# Patient Record
Sex: FEMALE | Race: WHITE | Employment: PART TIME | URBAN - METROPOLITAN AREA
[De-identification: names, ages, dates, MRNs, and addresses within clinical notes are randomized per-mention and may not be internally consistent; named-entity substitution may affect disease eponyms.]

---

## 2017-07-14 ENCOUNTER — HOSPITAL ENCOUNTER (OUTPATIENT)
Dept: NON INVASIVE DIAGNOSTICS | Age: 57
Discharge: HOME | End: 2017-07-14
Attending: INTERNAL MEDICINE | Admitting: INTERNAL MEDICINE

## 2017-07-14 DIAGNOSIS — M89.8X9 EXOSTOSIS OF UNSPECIFIED SITE: ICD-10-CM

## 2017-10-03 ENCOUNTER — HOSPITAL ENCOUNTER (OUTPATIENT)
Dept: MAMMOGRAPHY | Age: 57
Discharge: HOME | End: 2017-10-03
Attending: INTERNAL MEDICINE | Admitting: INTERNAL MEDICINE

## 2017-10-03 DIAGNOSIS — Z12.31 VISIT FOR SCREENING MAMMOGRAM: ICD-10-CM

## 2018-12-27 ENCOUNTER — HOSPITAL ENCOUNTER (OUTPATIENT)
Dept: MAMMOGRAPHY | Age: 58
Discharge: HOME | End: 2019-01-01
Payer: COMMERCIAL

## 2018-12-27 DIAGNOSIS — Z12.31 VISIT FOR SCREENING MAMMOGRAM: ICD-10-CM

## 2018-12-27 PROCEDURE — 77067 SCR MAMMO BI INCL CAD: CPT

## 2020-09-02 ENCOUNTER — APPOINTMENT (OUTPATIENT)
Dept: CT IMAGING | Age: 60
End: 2020-09-02
Payer: MEDICARE

## 2020-09-02 ENCOUNTER — HOSPITAL ENCOUNTER (OUTPATIENT)
Age: 60
Setting detail: OBSERVATION
Discharge: HOME | End: 2020-09-03
Attending: EMERGENCY MEDICINE | Admitting: INTERNAL MEDICINE
Payer: MEDICARE

## 2020-09-02 ENCOUNTER — APPOINTMENT (OUTPATIENT)
Dept: GENERAL RADIOLOGY | Age: 60
End: 2020-09-02
Payer: MEDICARE

## 2020-09-02 PROBLEM — I47.1 SVT (SUPRAVENTRICULAR TACHYCARDIA) (HCC): Status: ACTIVE | Noted: 2020-09-02

## 2020-09-02 LAB
A/G RATIO: 1.4 (ref 1.1–2.2)
ALBUMIN SERPL-MCNC: 4.5 G/DL (ref 3.4–5)
ALP BLD-CCNC: 94 U/L (ref 40–129)
ALT SERPL-CCNC: 56 U/L (ref 10–40)
ANION GAP SERPL CALCULATED.3IONS-SCNC: 17 MMOL/L (ref 3–16)
APTT: 28.4 SEC (ref 24.2–36.2)
AST SERPL-CCNC: 30 U/L (ref 15–37)
BASOPHILS ABSOLUTE: 0.1 K/UL (ref 0–0.2)
BASOPHILS RELATIVE PERCENT: 0.5 %
BILIRUB SERPL-MCNC: 0.3 MG/DL (ref 0–1)
BILIRUBIN URINE: NEGATIVE
BLOOD, URINE: NEGATIVE
BUN BLDV-MCNC: 12 MG/DL (ref 7–20)
CALCIUM SERPL-MCNC: 9.4 MG/DL (ref 8.3–10.6)
CHLORIDE BLD-SCNC: 98 MMOL/L (ref 99–110)
CLARITY: CLEAR
CO2: 22 MMOL/L (ref 21–32)
COLOR: YELLOW
CREAT SERPL-MCNC: 0.8 MG/DL (ref 0.6–1.2)
EOSINOPHILS ABSOLUTE: 0.1 K/UL (ref 0–0.6)
EOSINOPHILS RELATIVE PERCENT: 1.1 %
GFR AFRICAN AMERICAN: >60
GFR NON-AFRICAN AMERICAN: >60
GLOBULIN: 3.2 G/DL
GLUCOSE BLD-MCNC: 280 MG/DL (ref 70–99)
GLUCOSE BLD-MCNC: 282 MG/DL (ref 70–99)
GLUCOSE URINE: 100 MG/DL
HCT VFR BLD CALC: 43.5 % (ref 36–48)
HEMOGLOBIN: 14.5 G/DL (ref 12–16)
INR BLD: 1.02 (ref 0.86–1.14)
KETONES, URINE: NEGATIVE MG/DL
LEUKOCYTE ESTERASE, URINE: NEGATIVE
LYMPHOCYTES ABSOLUTE: 3 K/UL (ref 1–5.1)
LYMPHOCYTES RELATIVE PERCENT: 30.1 %
MAGNESIUM: 1.9 MG/DL (ref 1.8–2.4)
MCH RBC QN AUTO: 30.1 PG (ref 26–34)
MCHC RBC AUTO-ENTMCNC: 33.3 G/DL (ref 31–36)
MCV RBC AUTO: 90.5 FL (ref 80–100)
MICROSCOPIC EXAMINATION: ABNORMAL
MONOCYTES ABSOLUTE: 0.8 K/UL (ref 0–1.3)
MONOCYTES RELATIVE PERCENT: 8.2 %
NEUTROPHILS ABSOLUTE: 5.9 K/UL (ref 1.7–7.7)
NEUTROPHILS RELATIVE PERCENT: 60.1 %
NITRITE, URINE: NEGATIVE
PDW BLD-RTO: 13.6 % (ref 12.4–15.4)
PERFORMED ON: ABNORMAL
PH UA: 6 (ref 5–8)
PLATELET # BLD: 222 K/UL (ref 135–450)
PMV BLD AUTO: 9.6 FL (ref 5–10.5)
POTASSIUM SERPL-SCNC: 4 MMOL/L (ref 3.5–5.1)
PROTEIN UA: NEGATIVE MG/DL
PROTHROMBIN TIME: 11.8 SEC (ref 10–13.2)
RBC # BLD: 4.81 M/UL (ref 4–5.2)
SODIUM BLD-SCNC: 137 MMOL/L (ref 136–145)
SPECIFIC GRAVITY UA: <=1.005 (ref 1–1.03)
TOTAL PROTEIN: 7.7 G/DL (ref 6.4–8.2)
TROPONIN: 0.07 NG/ML
URINE REFLEX TO CULTURE: ABNORMAL
URINE TYPE: ABNORMAL
UROBILINOGEN, URINE: 0.2 E.U./DL
WBC # BLD: 9.9 K/UL (ref 4–11)

## 2020-09-02 PROCEDURE — G0378 HOSPITAL OBSERVATION PER HR: HCPCS

## 2020-09-02 PROCEDURE — 84484 ASSAY OF TROPONIN QUANT: CPT

## 2020-09-02 PROCEDURE — 2580000003 HC RX 258: Performed by: EMERGENCY MEDICINE

## 2020-09-02 PROCEDURE — 96375 TX/PRO/DX INJ NEW DRUG ADDON: CPT

## 2020-09-02 PROCEDURE — 93005 ELECTROCARDIOGRAM TRACING: CPT | Performed by: EMERGENCY MEDICINE

## 2020-09-02 PROCEDURE — 71045 X-RAY EXAM CHEST 1 VIEW: CPT

## 2020-09-02 PROCEDURE — 80053 COMPREHEN METABOLIC PANEL: CPT

## 2020-09-02 PROCEDURE — 6360000002 HC RX W HCPCS: Performed by: EMERGENCY MEDICINE

## 2020-09-02 PROCEDURE — 85730 THROMBOPLASTIN TIME PARTIAL: CPT

## 2020-09-02 PROCEDURE — 99285 EMERGENCY DEPT VISIT HI MDM: CPT

## 2020-09-02 PROCEDURE — 6370000000 HC RX 637 (ALT 250 FOR IP): Performed by: EMERGENCY MEDICINE

## 2020-09-02 PROCEDURE — 70450 CT HEAD/BRAIN W/O DYE: CPT

## 2020-09-02 PROCEDURE — 85610 PROTHROMBIN TIME: CPT

## 2020-09-02 PROCEDURE — 96374 THER/PROPH/DIAG INJ IV PUSH: CPT

## 2020-09-02 PROCEDURE — 36415 COLL VENOUS BLD VENIPUNCTURE: CPT

## 2020-09-02 PROCEDURE — 85025 COMPLETE CBC W/AUTO DIFF WBC: CPT

## 2020-09-02 PROCEDURE — 81003 URINALYSIS AUTO W/O SCOPE: CPT

## 2020-09-02 PROCEDURE — 83735 ASSAY OF MAGNESIUM: CPT

## 2020-09-02 RX ORDER — 0.9 % SODIUM CHLORIDE 0.9 %
1000 INTRAVENOUS SOLUTION INTRAVENOUS ONCE
Status: COMPLETED | OUTPATIENT
Start: 2020-09-02 | End: 2020-09-02

## 2020-09-02 RX ORDER — ONDANSETRON 2 MG/ML
4 INJECTION INTRAMUSCULAR; INTRAVENOUS ONCE
Status: COMPLETED | OUTPATIENT
Start: 2020-09-02 | End: 2020-09-02

## 2020-09-02 RX ORDER — ADENOSINE 3 MG/ML
6 INJECTION, SOLUTION INTRAVENOUS ONCE
Status: COMPLETED | OUTPATIENT
Start: 2020-09-02 | End: 2020-09-02

## 2020-09-02 RX ORDER — ASPIRIN 81 MG/1
324 TABLET, CHEWABLE ORAL ONCE
Status: COMPLETED | OUTPATIENT
Start: 2020-09-02 | End: 2020-09-02

## 2020-09-02 RX ADMIN — ASPIRIN 81 MG 324 MG: 81 TABLET ORAL at 22:34

## 2020-09-02 RX ADMIN — SODIUM CHLORIDE 1000 ML: 9 INJECTION, SOLUTION INTRAVENOUS at 21:19

## 2020-09-02 RX ADMIN — ADENOSINE 6 MG: 3 INJECTION, SOLUTION INTRAVENOUS at 21:19

## 2020-09-02 RX ADMIN — ONDANSETRON 4 MG: 2 INJECTION INTRAMUSCULAR; INTRAVENOUS at 21:19

## 2020-09-03 ENCOUNTER — APPOINTMENT (OUTPATIENT)
Dept: CARDIAC CATH/INVASIVE PROCEDURES | Age: 60
End: 2020-09-03
Payer: MEDICARE

## 2020-09-03 ENCOUNTER — ANESTHESIA EVENT (OUTPATIENT)
Dept: CARDIAC CATH/INVASIVE PROCEDURES | Age: 60
End: 2020-09-03
Payer: MEDICARE

## 2020-09-03 ENCOUNTER — ANESTHESIA (OUTPATIENT)
Dept: CARDIAC CATH/INVASIVE PROCEDURES | Age: 60
End: 2020-09-03
Payer: MEDICARE

## 2020-09-03 VITALS
TEMPERATURE: 97.2 F | HEART RATE: 79 BPM | HEIGHT: 62 IN | OXYGEN SATURATION: 94 % | SYSTOLIC BLOOD PRESSURE: 130 MMHG | DIASTOLIC BLOOD PRESSURE: 85 MMHG | WEIGHT: 198.41 LBS | RESPIRATION RATE: 16 BRPM | BODY MASS INDEX: 36.51 KG/M2

## 2020-09-03 VITALS
RESPIRATION RATE: 1 BRPM | SYSTOLIC BLOOD PRESSURE: 160 MMHG | TEMPERATURE: 96.8 F | OXYGEN SATURATION: 96 % | DIASTOLIC BLOOD PRESSURE: 77 MMHG

## 2020-09-03 LAB
ANION GAP SERPL CALCULATED.3IONS-SCNC: 11 MMOL/L (ref 3–16)
BUN BLDV-MCNC: 9 MG/DL (ref 7–20)
CALCIUM SERPL-MCNC: 8.7 MG/DL (ref 8.3–10.6)
CHLORIDE BLD-SCNC: 107 MMOL/L (ref 99–110)
CO2: 25 MMOL/L (ref 21–32)
CREAT SERPL-MCNC: 0.7 MG/DL (ref 0.6–1.2)
EKG ATRIAL RATE: 114 BPM
EKG ATRIAL RATE: 77 BPM
EKG ATRIAL RATE: 96 BPM
EKG DIAGNOSIS: NORMAL
EKG P AXIS: 36 DEGREES
EKG P AXIS: 53 DEGREES
EKG P AXIS: 58 DEGREES
EKG P-R INTERVAL: 158 MS
EKG P-R INTERVAL: 158 MS
EKG P-R INTERVAL: 160 MS
EKG Q-T INTERVAL: 316 MS
EKG Q-T INTERVAL: 366 MS
EKG Q-T INTERVAL: 392 MS
EKG Q-T INTERVAL: 426 MS
EKG QRS DURATION: 118 MS
EKG QRS DURATION: 134 MS
EKG QRS DURATION: 138 MS
EKG QRS DURATION: 74 MS
EKG QTC CALCULATION (BAZETT): 482 MS
EKG QTC CALCULATION (BAZETT): 495 MS
EKG QTC CALCULATION (BAZETT): 504 MS
EKG QTC CALCULATION (BAZETT): 527 MS
EKG R AXIS: -17 DEGREES
EKG R AXIS: -57 DEGREES
EKG R AXIS: -63 DEGREES
EKG R AXIS: -71 DEGREES
EKG T AXIS: 1 DEGREES
EKG T AXIS: 232 DEGREES
EKG T AXIS: 3 DEGREES
EKG T AXIS: 35 DEGREES
EKG VENTRICULAR RATE: 114 BPM
EKG VENTRICULAR RATE: 167 BPM
EKG VENTRICULAR RATE: 77 BPM
EKG VENTRICULAR RATE: 96 BPM
GFR AFRICAN AMERICAN: >60
GFR NON-AFRICAN AMERICAN: >60
GLUCOSE BLD-MCNC: 133 MG/DL (ref 70–99)
GLUCOSE BLD-MCNC: 140 MG/DL (ref 70–99)
GLUCOSE BLD-MCNC: 158 MG/DL (ref 70–99)
GLUCOSE BLD-MCNC: 165 MG/DL (ref 70–99)
HCT VFR BLD CALC: 39.1 % (ref 36–48)
HEMOGLOBIN: 13 G/DL (ref 12–16)
MCH RBC QN AUTO: 29.8 PG (ref 26–34)
MCHC RBC AUTO-ENTMCNC: 33.2 G/DL (ref 31–36)
MCV RBC AUTO: 89.9 FL (ref 80–100)
PDW BLD-RTO: 13.6 % (ref 12.4–15.4)
PERFORMED ON: ABNORMAL
PLATELET # BLD: 166 K/UL (ref 135–450)
PMV BLD AUTO: 9.5 FL (ref 5–10.5)
POTASSIUM REFLEX MAGNESIUM: 3.9 MMOL/L (ref 3.5–5.1)
RBC # BLD: 4.35 M/UL (ref 4–5.2)
SARS-COV-2, NAAT: NOT DETECTED
SODIUM BLD-SCNC: 143 MMOL/L (ref 136–145)
TROPONIN: 0.08 NG/ML
TROPONIN: 0.09 NG/ML
TSH REFLEX: 3.65 UIU/ML (ref 0.27–4.2)
WBC # BLD: 6.7 K/UL (ref 4–11)

## 2020-09-03 PROCEDURE — 6360000002 HC RX W HCPCS: Performed by: NURSE ANESTHETIST, CERTIFIED REGISTERED

## 2020-09-03 PROCEDURE — 2700000000 HC OXYGEN THERAPY PER DAY

## 2020-09-03 PROCEDURE — 2500000003 HC RX 250 WO HCPCS: Performed by: NURSE ANESTHETIST, CERTIFIED REGISTERED

## 2020-09-03 PROCEDURE — 84443 ASSAY THYROID STIM HORMONE: CPT

## 2020-09-03 PROCEDURE — 93010 ELECTROCARDIOGRAM REPORT: CPT | Performed by: INTERNAL MEDICINE

## 2020-09-03 PROCEDURE — 93005 ELECTROCARDIOGRAM TRACING: CPT | Performed by: NURSE PRACTITIONER

## 2020-09-03 PROCEDURE — 94761 N-INVAS EAR/PLS OXIMETRY MLT: CPT

## 2020-09-03 PROCEDURE — G0378 HOSPITAL OBSERVATION PER HR: HCPCS

## 2020-09-03 PROCEDURE — C1732 CATH, EP, DIAG/ABL, 3D/VECT: HCPCS

## 2020-09-03 PROCEDURE — 93623 PRGRMD STIMJ&PACG IV RX NFS: CPT | Performed by: FAMILY MEDICINE

## 2020-09-03 PROCEDURE — 7100000000 HC PACU RECOVERY - FIRST 15 MIN

## 2020-09-03 PROCEDURE — 93613 INTRACARDIAC EPHYS 3D MAPG: CPT | Performed by: FAMILY MEDICINE

## 2020-09-03 PROCEDURE — 93005 ELECTROCARDIOGRAM TRACING: CPT | Performed by: INTERNAL MEDICINE

## 2020-09-03 PROCEDURE — 36415 COLL VENOUS BLD VENIPUNCTURE: CPT

## 2020-09-03 PROCEDURE — 2580000003 HC RX 258: Performed by: NURSE PRACTITIONER

## 2020-09-03 PROCEDURE — U0002 COVID-19 LAB TEST NON-CDC: HCPCS

## 2020-09-03 PROCEDURE — 6360000002 HC RX W HCPCS: Performed by: NURSE PRACTITIONER

## 2020-09-03 PROCEDURE — 6360000002 HC RX W HCPCS

## 2020-09-03 PROCEDURE — C1730 CATH, EP, 19 OR FEW ELECT: HCPCS

## 2020-09-03 PROCEDURE — 96375 TX/PRO/DX INJ NEW DRUG ADDON: CPT

## 2020-09-03 PROCEDURE — 93623 PRGRMD STIMJ&PACG IV RX NFS: CPT | Performed by: INTERNAL MEDICINE

## 2020-09-03 PROCEDURE — C1894 INTRO/SHEATH, NON-LASER: HCPCS

## 2020-09-03 PROCEDURE — 2580000003 HC RX 258

## 2020-09-03 PROCEDURE — 93621 COMP EP EVL L PAC&REC C SINS: CPT | Performed by: INTERNAL MEDICINE

## 2020-09-03 PROCEDURE — 93613 INTRACARDIAC EPHYS 3D MAPG: CPT | Performed by: INTERNAL MEDICINE

## 2020-09-03 PROCEDURE — 2500000003 HC RX 250 WO HCPCS

## 2020-09-03 PROCEDURE — 99244 OFF/OP CNSLTJ NEW/EST MOD 40: CPT | Performed by: NURSE PRACTITIONER

## 2020-09-03 PROCEDURE — 94760 N-INVAS EAR/PLS OXIMETRY 1: CPT

## 2020-09-03 PROCEDURE — 3700000001 HC ADD 15 MINUTES (ANESTHESIA)

## 2020-09-03 PROCEDURE — 3700000000 HC ANESTHESIA ATTENDED CARE

## 2020-09-03 PROCEDURE — 85027 COMPLETE CBC AUTOMATED: CPT

## 2020-09-03 PROCEDURE — 93653 COMPRE EP EVAL TX SVT: CPT | Performed by: INTERNAL MEDICINE

## 2020-09-03 PROCEDURE — 80048 BASIC METABOLIC PNL TOTAL CA: CPT

## 2020-09-03 PROCEDURE — 84484 ASSAY OF TROPONIN QUANT: CPT

## 2020-09-03 PROCEDURE — 93653 COMPRE EP EVAL TX SVT: CPT | Performed by: FAMILY MEDICINE

## 2020-09-03 PROCEDURE — 6370000000 HC RX 637 (ALT 250 FOR IP): Performed by: NURSE PRACTITIONER

## 2020-09-03 PROCEDURE — 7100000001 HC PACU RECOVERY - ADDTL 15 MIN

## 2020-09-03 RX ORDER — ONDANSETRON 2 MG/ML
INJECTION INTRAMUSCULAR; INTRAVENOUS PRN
Status: DISCONTINUED | OUTPATIENT
Start: 2020-09-03 | End: 2020-09-03 | Stop reason: SDUPTHER

## 2020-09-03 RX ORDER — SODIUM CHLORIDE 0.9 % (FLUSH) 0.9 %
10 SYRINGE (ML) INJECTION PRN
Status: DISCONTINUED | OUTPATIENT
Start: 2020-09-03 | End: 2020-09-03 | Stop reason: HOSPADM

## 2020-09-03 RX ORDER — POLYETHYLENE GLYCOL 3350 17 G/17G
17 POWDER, FOR SOLUTION ORAL DAILY PRN
Status: DISCONTINUED | OUTPATIENT
Start: 2020-09-03 | End: 2020-09-03 | Stop reason: HOSPADM

## 2020-09-03 RX ORDER — BUSPIRONE HYDROCHLORIDE 15 MG/1
15 TABLET ORAL 2 TIMES DAILY
Status: DISCONTINUED | OUTPATIENT
Start: 2020-09-03 | End: 2020-09-03 | Stop reason: HOSPADM

## 2020-09-03 RX ORDER — KETAMINE HCL IN NACL, ISO-OSM 100MG/10ML
SYRINGE (ML) INJECTION PRN
Status: DISCONTINUED | OUTPATIENT
Start: 2020-09-03 | End: 2020-09-03 | Stop reason: SDUPTHER

## 2020-09-03 RX ORDER — PROMETHAZINE HYDROCHLORIDE 25 MG/1
12.5 TABLET ORAL EVERY 6 HOURS PRN
Status: DISCONTINUED | OUTPATIENT
Start: 2020-09-03 | End: 2020-09-03 | Stop reason: HOSPADM

## 2020-09-03 RX ORDER — SODIUM CHLORIDE 0.9 % (FLUSH) 0.9 %
10 SYRINGE (ML) INJECTION EVERY 12 HOURS SCHEDULED
Status: DISCONTINUED | OUTPATIENT
Start: 2020-09-03 | End: 2020-09-03 | Stop reason: HOSPADM

## 2020-09-03 RX ORDER — IBUPROFEN 400 MG/1
800 TABLET ORAL NIGHTLY PRN
Status: DISCONTINUED | OUTPATIENT
Start: 2020-09-03 | End: 2020-09-03 | Stop reason: HOSPADM

## 2020-09-03 RX ORDER — NICOTINE POLACRILEX 4 MG
15 LOZENGE BUCCAL PRN
Status: DISCONTINUED | OUTPATIENT
Start: 2020-09-03 | End: 2020-09-03 | Stop reason: HOSPADM

## 2020-09-03 RX ORDER — PROPOFOL 10 MG/ML
INJECTION, EMULSION INTRAVENOUS PRN
Status: DISCONTINUED | OUTPATIENT
Start: 2020-09-03 | End: 2020-09-03 | Stop reason: SDUPTHER

## 2020-09-03 RX ORDER — DEXTROSE MONOHYDRATE 50 MG/ML
100 INJECTION, SOLUTION INTRAVENOUS PRN
Status: DISCONTINUED | OUTPATIENT
Start: 2020-09-03 | End: 2020-09-03 | Stop reason: HOSPADM

## 2020-09-03 RX ORDER — PAROXETINE HYDROCHLORIDE 20 MG/1
20 TABLET, FILM COATED ORAL EVERY MORNING
Status: DISCONTINUED | OUTPATIENT
Start: 2020-09-03 | End: 2020-09-03 | Stop reason: HOSPADM

## 2020-09-03 RX ORDER — SUCCINYLCHOLINE/SOD CL,ISO/PF 200MG/10ML
SYRINGE (ML) INTRAVENOUS PRN
Status: DISCONTINUED | OUTPATIENT
Start: 2020-09-03 | End: 2020-09-03 | Stop reason: SDUPTHER

## 2020-09-03 RX ORDER — ASPIRIN 81 MG/1
81 TABLET, CHEWABLE ORAL DAILY
Qty: 30 TABLET | Refills: 3 | Status: SHIPPED | OUTPATIENT
Start: 2020-09-04

## 2020-09-03 RX ORDER — ASPIRIN 81 MG/1
81 TABLET, CHEWABLE ORAL DAILY
Status: DISCONTINUED | OUTPATIENT
Start: 2020-09-03 | End: 2020-09-03 | Stop reason: HOSPADM

## 2020-09-03 RX ORDER — SODIUM CHLORIDE 0.9 % (FLUSH) 0.9 %
10 SYRINGE (ML) INJECTION PRN
Status: DISCONTINUED | OUTPATIENT
Start: 2020-09-03 | End: 2020-09-03 | Stop reason: SDUPTHER

## 2020-09-03 RX ORDER — MAGNESIUM SULFATE 1 G/100ML
1 INJECTION INTRAVENOUS ONCE
Status: COMPLETED | OUTPATIENT
Start: 2020-09-03 | End: 2020-09-03

## 2020-09-03 RX ORDER — DEXAMETHASONE SODIUM PHOSPHATE 4 MG/ML
INJECTION, SOLUTION INTRA-ARTICULAR; INTRALESIONAL; INTRAMUSCULAR; INTRAVENOUS; SOFT TISSUE PRN
Status: DISCONTINUED | OUTPATIENT
Start: 2020-09-03 | End: 2020-09-03 | Stop reason: SDUPTHER

## 2020-09-03 RX ORDER — LEVOTHYROXINE SODIUM 0.15 MG/1
150 TABLET ORAL DAILY
COMMUNITY

## 2020-09-03 RX ORDER — GLYCOPYRROLATE 0.2 MG/ML
INJECTION INTRAMUSCULAR; INTRAVENOUS PRN
Status: DISCONTINUED | OUTPATIENT
Start: 2020-09-03 | End: 2020-09-03 | Stop reason: SDUPTHER

## 2020-09-03 RX ORDER — ACETAMINOPHEN 650 MG/1
650 SUPPOSITORY RECTAL EVERY 6 HOURS PRN
Status: DISCONTINUED | OUTPATIENT
Start: 2020-09-03 | End: 2020-09-03 | Stop reason: HOSPADM

## 2020-09-03 RX ORDER — TRAZODONE HYDROCHLORIDE 100 MG/1
100 TABLET ORAL NIGHTLY
Status: DISCONTINUED | OUTPATIENT
Start: 2020-09-03 | End: 2020-09-03 | Stop reason: HOSPADM

## 2020-09-03 RX ORDER — LEVOTHYROXINE SODIUM 0.05 MG/1
50 TABLET ORAL DAILY
Status: DISCONTINUED | OUTPATIENT
Start: 2020-09-03 | End: 2020-09-03 | Stop reason: HOSPADM

## 2020-09-03 RX ORDER — ACETAMINOPHEN 325 MG/1
650 TABLET ORAL EVERY 6 HOURS PRN
Status: DISCONTINUED | OUTPATIENT
Start: 2020-09-03 | End: 2020-09-03 | Stop reason: SDUPTHER

## 2020-09-03 RX ORDER — ONDANSETRON 2 MG/ML
4 INJECTION INTRAMUSCULAR; INTRAVENOUS EVERY 6 HOURS PRN
Status: DISCONTINUED | OUTPATIENT
Start: 2020-09-03 | End: 2020-09-03 | Stop reason: HOSPADM

## 2020-09-03 RX ORDER — DEXTROSE MONOHYDRATE 25 G/50ML
12.5 INJECTION, SOLUTION INTRAVENOUS PRN
Status: DISCONTINUED | OUTPATIENT
Start: 2020-09-03 | End: 2020-09-03 | Stop reason: HOSPADM

## 2020-09-03 RX ORDER — LIDOCAINE HYDROCHLORIDE 10 MG/ML
INJECTION, SOLUTION EPIDURAL; INFILTRATION; INTRACAUDAL; PERINEURAL PRN
Status: DISCONTINUED | OUTPATIENT
Start: 2020-09-03 | End: 2020-09-03 | Stop reason: SDUPTHER

## 2020-09-03 RX ORDER — SODIUM CHLORIDE 0.9 % (FLUSH) 0.9 %
10 SYRINGE (ML) INJECTION EVERY 12 HOURS SCHEDULED
Status: DISCONTINUED | OUTPATIENT
Start: 2020-09-03 | End: 2020-09-03 | Stop reason: SDUPTHER

## 2020-09-03 RX ORDER — SODIUM CHLORIDE 9 MG/ML
INJECTION, SOLUTION INTRAVENOUS CONTINUOUS
Status: DISCONTINUED | OUTPATIENT
Start: 2020-09-03 | End: 2020-09-03

## 2020-09-03 RX ORDER — FENTANYL CITRATE 50 UG/ML
INJECTION, SOLUTION INTRAMUSCULAR; INTRAVENOUS PRN
Status: DISCONTINUED | OUTPATIENT
Start: 2020-09-03 | End: 2020-09-03 | Stop reason: SDUPTHER

## 2020-09-03 RX ORDER — MIDAZOLAM HYDROCHLORIDE 1 MG/ML
INJECTION INTRAMUSCULAR; INTRAVENOUS PRN
Status: DISCONTINUED | OUTPATIENT
Start: 2020-09-03 | End: 2020-09-03 | Stop reason: SDUPTHER

## 2020-09-03 RX ORDER — ACETAMINOPHEN 325 MG/1
650 TABLET ORAL EVERY 4 HOURS PRN
Status: DISCONTINUED | OUTPATIENT
Start: 2020-09-03 | End: 2020-09-03 | Stop reason: HOSPADM

## 2020-09-03 RX ORDER — DOBUTAMINE HYDROCHLORIDE 200 MG/100ML
INJECTION INTRAVENOUS CONTINUOUS PRN
Status: DISCONTINUED | OUTPATIENT
Start: 2020-09-03 | End: 2020-09-03 | Stop reason: SDUPTHER

## 2020-09-03 RX ADMIN — Medication 120 MG: at 12:00

## 2020-09-03 RX ADMIN — PAROXETINE HYDROCHLORIDE 20 MG: 20 TABLET, FILM COATED ORAL at 08:23

## 2020-09-03 RX ADMIN — PROPOFOL 30 MG: 10 INJECTION, EMULSION INTRAVENOUS at 12:13

## 2020-09-03 RX ADMIN — DEXAMETHASONE SODIUM PHOSPHATE 8 MG: 4 INJECTION, SOLUTION INTRAMUSCULAR; INTRAVENOUS at 11:22

## 2020-09-03 RX ADMIN — FENTANYL CITRATE 50 MCG: 50 INJECTION INTRAMUSCULAR; INTRAVENOUS at 11:41

## 2020-09-03 RX ADMIN — SODIUM CHLORIDE: 9 INJECTION, SOLUTION INTRAVENOUS at 10:32

## 2020-09-03 RX ADMIN — ASPIRIN 81 MG: 81 TABLET, CHEWABLE ORAL at 08:23

## 2020-09-03 RX ADMIN — MIDAZOLAM 2 MG: 1 INJECTION INTRAMUSCULAR; INTRAVENOUS at 11:10

## 2020-09-03 RX ADMIN — LIDOCAINE HYDROCHLORIDE 50 MG: 10 INJECTION, SOLUTION EPIDURAL; INFILTRATION; INTRACAUDAL; PERINEURAL at 12:00

## 2020-09-03 RX ADMIN — MIDAZOLAM 2 MG: 1 INJECTION INTRAMUSCULAR; INTRAVENOUS at 11:31

## 2020-09-03 RX ADMIN — SODIUM CHLORIDE, PRESERVATIVE FREE 10 ML: 5 INJECTION INTRAVENOUS at 11:46

## 2020-09-03 RX ADMIN — FENTANYL CITRATE 50 MCG: 50 INJECTION INTRAMUSCULAR; INTRAVENOUS at 11:39

## 2020-09-03 RX ADMIN — Medication 10 MG: at 11:35

## 2020-09-03 RX ADMIN — BUSPIRONE HYDROCHLORIDE 15 MG: 15 TABLET ORAL at 08:23

## 2020-09-03 RX ADMIN — MAGNESIUM SULFATE HEPTAHYDRATE 1 G: 1 INJECTION, SOLUTION INTRAVENOUS at 10:31

## 2020-09-03 RX ADMIN — PROPOFOL 200 MG: 10 INJECTION, EMULSION INTRAVENOUS at 12:00

## 2020-09-03 RX ADMIN — LEVOTHYROXINE SODIUM 50 MCG: 0.05 TABLET ORAL at 07:00

## 2020-09-03 RX ADMIN — Medication 10 MG: at 11:39

## 2020-09-03 RX ADMIN — BUSPIRONE HYDROCHLORIDE 15 MG: 15 TABLET ORAL at 02:00

## 2020-09-03 RX ADMIN — Medication 10 MG: at 11:32

## 2020-09-03 RX ADMIN — Medication 10 MG: at 11:19

## 2020-09-03 RX ADMIN — DOBUTAMINE HYDROCHLORIDE 10 MCG/KG/MIN: 200 INJECTION INTRAVENOUS at 12:17

## 2020-09-03 RX ADMIN — TRAZODONE HYDROCHLORIDE 100 MG: 100 TABLET ORAL at 02:00

## 2020-09-03 RX ADMIN — FENTANYL CITRATE 100 MCG: 50 INJECTION INTRAMUSCULAR; INTRAVENOUS at 11:13

## 2020-09-03 RX ADMIN — GLYCOPYRROLATE 0.2 MG: 0.2 INJECTION, SOLUTION INTRAMUSCULAR; INTRAVENOUS at 11:22

## 2020-09-03 RX ADMIN — Medication 20 MG: at 11:28

## 2020-09-03 RX ADMIN — IBUPROFEN 800 MG: 400 TABLET, FILM COATED ORAL at 02:00

## 2020-09-03 RX ADMIN — ONDANSETRON 4 MG: 2 INJECTION INTRAMUSCULAR; INTRAVENOUS at 11:22

## 2020-09-03 ASSESSMENT — PULMONARY FUNCTION TESTS
PIF_VALUE: 1
PIF_VALUE: 1
PIF_VALUE: 23
PIF_VALUE: 1
PIF_VALUE: 72
PIF_VALUE: 17
PIF_VALUE: 1
PIF_VALUE: 29
PIF_VALUE: 25
PIF_VALUE: 1
PIF_VALUE: 5
PIF_VALUE: 2
PIF_VALUE: 24
PIF_VALUE: 3
PIF_VALUE: 1
PIF_VALUE: 25
PIF_VALUE: 1
PIF_VALUE: 1
PIF_VALUE: 42
PIF_VALUE: 25
PIF_VALUE: 1
PIF_VALUE: 55
PIF_VALUE: 25
PIF_VALUE: 1
PIF_VALUE: 25
PIF_VALUE: 1
PIF_VALUE: 1
PIF_VALUE: 29
PIF_VALUE: 1
PIF_VALUE: 38
PIF_VALUE: 25
PIF_VALUE: 25
PIF_VALUE: 1
PIF_VALUE: 45
PIF_VALUE: 1
PIF_VALUE: 25
PIF_VALUE: 1
PIF_VALUE: 25
PIF_VALUE: 1
PIF_VALUE: 25
PIF_VALUE: 1
PIF_VALUE: 1
PIF_VALUE: 24
PIF_VALUE: 31
PIF_VALUE: 26
PIF_VALUE: 28
PIF_VALUE: 1
PIF_VALUE: 1
PIF_VALUE: 29
PIF_VALUE: 1
PIF_VALUE: 33
PIF_VALUE: 25
PIF_VALUE: 4
PIF_VALUE: 25
PIF_VALUE: 5
PIF_VALUE: 1
PIF_VALUE: 26
PIF_VALUE: 28
PIF_VALUE: 1
PIF_VALUE: 3
PIF_VALUE: 30
PIF_VALUE: 25
PIF_VALUE: 26
PIF_VALUE: 1
PIF_VALUE: 22
PIF_VALUE: 1
PIF_VALUE: 1
PIF_VALUE: 29

## 2020-09-03 ASSESSMENT — PAIN DESCRIPTION - ONSET: ONSET: ON-GOING

## 2020-09-03 ASSESSMENT — PAIN - FUNCTIONAL ASSESSMENT
PAIN_FUNCTIONAL_ASSESSMENT: ACTIVITIES ARE NOT PREVENTED
PAIN_FUNCTIONAL_ASSESSMENT: ACTIVITIES ARE NOT PREVENTED

## 2020-09-03 ASSESSMENT — PAIN SCALES - GENERAL
PAINLEVEL_OUTOF10: 2
PAINLEVEL_OUTOF10: 2
PAINLEVEL_OUTOF10: 0
PAINLEVEL_OUTOF10: 0
PAINLEVEL_OUTOF10: 2
PAINLEVEL_OUTOF10: 0
PAINLEVEL_OUTOF10: 0

## 2020-09-03 ASSESSMENT — PAIN DESCRIPTION - DESCRIPTORS
DESCRIPTORS: ACHING;DISCOMFORT
DESCRIPTORS: HEADACHE

## 2020-09-03 ASSESSMENT — PAIN DESCRIPTION - PAIN TYPE
TYPE: ACUTE PAIN
TYPE: ACUTE PAIN

## 2020-09-03 ASSESSMENT — PAIN DESCRIPTION - FREQUENCY
FREQUENCY: INTERMITTENT
FREQUENCY: INTERMITTENT

## 2020-09-03 ASSESSMENT — ENCOUNTER SYMPTOMS: SHORTNESS OF BREATH: 0

## 2020-09-03 ASSESSMENT — PAIN DESCRIPTION - PROGRESSION: CLINICAL_PROGRESSION: NOT CHANGED

## 2020-09-03 ASSESSMENT — PAIN DESCRIPTION - LOCATION
LOCATION: HEAD
LOCATION: HEAD

## 2020-09-03 NOTE — PROGRESS NOTES
4 Eyes Skin Assessment     The patient is being assess for  Admission    I agree that 2 RN's have performed a thorough Head to Toe Skin Assessment on the patient. ALL assessment sites listed below have been assessed. Areas assessed by both nurses:   [x]   Head, Face, and Ears   [x]   Shoulders, Back, and Chest  [x]   Arms, Elbows, and Hands   [x]   Coccyx, Sacrum, and IschIum  [x]   Legs, Feet, and Heels        Does the Patient have Skin Breakdown?   No         Huseyin Prevention initiated:  No   Wound Care Orders initiated:  No      [facility] nurse consulted for Pressure Injury (Stage 3,4, Unstageable, DTI, NWPT, and Complex wounds), New and Established Ostomies:  No      Nurse 1 eSignature: Electronically signed by Earlene Oh RN on 9/3/20 at 5:02 AM EDT    **SHARE this note so that the co-signing nurse is able to place an eSignature**    Nurse 2 eSignature: Electronically signed by Zhao Petit RN on 9/3/20 at 11:45 AM EDT

## 2020-09-03 NOTE — ED NOTES
Pt ambulated to restroom. Vital signs remained stable. No c/o dizziness, shortness of breath, or palpitations.       Salima Vital RN  09/03/20 0578

## 2020-09-03 NOTE — ED NOTES
Pt current HR is SVT at 170, verbal consent for adenosine.  Pt placed on heart monitor, cardiac pad and 12 lead EKG, 2 lpm O2. 6mg of Adenocard given rapid IV push, HR decrease to 100 BPM.     Adi Masterson RN  09/02/20 2134       Adi Masterson RN  09/02/20 2135

## 2020-09-03 NOTE — PLAN OF CARE
Problem: Pain:  Goal: Pain level will decrease  Description: Pain level will decrease  9/3/2020 0827 by Judy Hinojosa RN  Outcome: Ongoing  9/3/2020 0443 by Gabino Marrero RN  Outcome: Ongoing  Goal: Control of acute pain  Description: Control of acute pain  9/3/2020 0827 by Judy Hinojosa RN  Outcome: Ongoing  9/3/2020 0443 by Gabino Marrero RN  Outcome: Ongoing  Goal: Control of chronic pain  Description: Control of chronic pain  9/3/2020 0827 by Judy Hinojosa RN  Outcome: Ongoing  9/3/2020 0443 by Gabino Marrero RN  Outcome: Ongoing     Problem: Cardiac:  Goal: Ability to maintain an adequate cardiac output will improve  Description: Ability to maintain an adequate cardiac output will improve  9/3/2020 0827 by Judy Hinojosa RN  Outcome: Ongoing  9/3/2020 0443 by Gabino Marrero RN  Outcome: Ongoing  Goal: Complications related to the disease process, condition or treatment will be avoided or minimized  Description: Complications related to the disease process, condition or treatment will be avoided or minimized  9/3/2020 0827 by Judy Hinojosa RN  Outcome: Ongoing  9/3/2020 0443 by Gabino Marrero RN  Outcome: Ongoing  Goal: Risk factors for ineffective tissue perfusion will decrease  Description: Risk factors for ineffective tissue perfusion will decrease  9/3/2020 0827 by Judy Hinojosa RN  Outcome: Ongoing  9/3/2020 0443 by Gabino Marrero RN  Outcome: Ongoing     Problem: Falls - Risk of:  Goal: Will remain free from falls  Description: Will remain free from falls  Outcome: Ongoing  Goal: Absence of physical injury  Description: Absence of physical injury  Outcome: Ongoing

## 2020-09-03 NOTE — H&P
Hospital Medicine History & Physical      PCP: Rosalind Pollock MD    Date of Admission: 9/2/2020    Date of Service: Pt seen/examined on 9/3/2020and Admitted to Inpatient  Placed in Observation. Chief Complaint: Dizziness, chest pressure for several hours      History Of Present Illness: The patient is a 56 y.o. female who presents to [facility] with history of hypothyroidism, gastric reflux, type 2 diabetes. She presented to the emergency department with elevated heart rate and several hours of dizziness and generalized chest pressure. Patient denies syncope or near syncope, however she felt like she could pass out a couple of times during the day. She thought it was her blood sugar and checked her blood sugar. When she arrived she had a heart rate roughly 160-1 70. Dr. Jose Manuel Fuller, ED attending attempted Valsalva maneuver which was unsuccessful and he proceeded to a chemical cardioversion with adenosine. On arrival to the hospital  After transfer the patient reports that she just feels tired but other than that she is not having chest pain. She denies any recent medication changes, diet pills. She denies any new medications. She denies any caffeine withdrawal or change of diet. She denies any recent cough cold fever or flu. Past Medical History:        Diagnosis Date    GERD (gastroesophageal reflux disease)        Past Surgical History:    No past surgical history on file. Medications Prior to Admission:    Prior to Admission medications    Medication Sig Start Date End Date Taking?  Authorizing Provider   ibuprofen (ADVIL;MOTRIN) 800 MG tablet Take 800 mg by mouth every 6 hours as needed for Pain   Yes Historical Provider, MD   busPIRone (BUSPAR) 10 MG tablet Take 100 mg by mouth 3 times daily   Yes Historical Provider, MD   PARoxetine (PAXIL) 20 MG tablet Take 20 mg by mouth every morning   Yes Historical Provider, MD   traZODone (DESYREL) 100 MG tablet Take 100 mg by mouth nightly   Yes Historical Provider, MD   Omeprazole (PRILOSEC PO) Take 20 mg by mouth daily   Yes Historical Provider, MD       Allergies: Iv dye [iodides] and Erythromycin    Social History:  The patient currently lives at home. TOBACCO:   reports that she has never smoked. She does not have any smokeless tobacco history on file. ETOH:   reports no history of alcohol use. Family History:  Reviewed in detail and negative for DM, Early CAD, Cancer, CVA. Positive as follows:    No family history on file. REVIEW OF SYSTEMS:   Positive per HPI. All other systems reviewed and negative. PHYSICAL EXAM:    /73   Pulse 73   Temp 97.7 °F (36.5 °C) (Oral)   Resp 18   Ht 5' 3\" (1.6 m)   Wt 198 lb 3.1 oz (89.9 kg)   SpO2 97%   BMI 35.11 kg/m²     General appearance: No apparent distress appears stated age and cooperative. HEENT Normal cephalic, atraumatic without obvious deformity. Pupils equal, round, and reactive to light. Extra ocular muscles intact. Conjunctivae/corneas clear. Neck: Supple, No jugular venous distention/bruits. Trachea midline without thyromegaly or adenopathy with full range of motion. Lungs: Clear to auscultation, bilaterally without Rales/Wheezes/Rhonchi with good respiratory effort. Heart: Regular rate and rhythm with Normal S1/S2 without murmurs, rubs or gallops, point of maximum impulse non-displaced  Abdomen: Soft, non-tender or non-distended without rigidity or guarding and positive bowel sounds all four quadrants. Extremities: No clubbing, cyanosis, or edema bilaterally. Full range of motion without deformity and normal gait intact. Skin: Skin color, texture, turgor normal.  No rashes or lesions.   Neurologic: Alert and oriented X 3, neurovascularly intact with sensory/motor intact upper extremities/lower extremities, bilaterally. Cranial nerves: II-XII intact, grossly non-focal.  Mental status: Alert, oriented, thought content appropriate. Capillary Refill: Acceptable  < 3 seconds  Peripheral Pulses: +3 Easily felt, not easily obliterated with pressure      CXR:  I have reviewed the CXR with the following interpretation: Negative  EKG:  I have reviewed the EKG with the following interpretation: Sinus rhythm right bundle branch block. No evidence of STEMI    CBC   Recent Labs     09/02/20 2055   WBC 9.9   HGB 14.5   HCT 43.5         RENAL  Recent Labs     09/02/20 2055      K 4.0   CL 98*   CO2 22   BUN 12   CREATININE 0.8     LFT'S  Recent Labs     09/02/20 2055   AST 30   ALT 56*   BILITOT 0.3   ALKPHOS 94     COAG  Recent Labs     09/02/20 2055   INR 1.02     CARDIAC ENZYMES  Recent Labs     09/02/20 2055   TROPONINI 0.07*       U/A:    Lab Results   Component Value Date    COLORU Yellow 09/02/2020    RBCUA TNTC (H) All else obscured 04/26/2012    CLARITYU Clear 09/02/2020    SPECGRAV <=1.005 09/02/2020    LEUKOCYTESUR Negative 09/02/2020    BLOODU Negative 09/02/2020    GLUCOSEU 100 09/02/2020    GLUCOSEU NEGATIVE 04/26/2012       ABG  No results found for: ZZU1GNJ, BEART, B8GCSUWV, PHART, THGBART, EER6HJW, PO2ART, SOP7CMF        Active Hospital Problems    Diagnosis Date Noted    SVT (supraventricular tachycardia) ([facility] [address] [facility] [I47.1] 09/02/2020         PHYSICIANS CERTIFICATION:    I certify that Sebastien Rashid is expected to be hospitalized for less than 2 midnights based on the following assessment and plan:      ASSESSMENT/PLAN:    SVT: Telemetry monitoring. Trope elevated 0.07-> likely related to demand strain secondary to SVT           We will obtain serial troponins and repeat EKG           ASA 81 mg daily           TSH level in a.m. Consult cardiology           Echocardiogram ordered    Hypothyroid: Continue levothyroxine and TSH level this a.m.     Type II DM: Hold metformin, SSI, hypoglycemia protocol and FS BS    Depression: Continue home medications    DVT Prophylaxis: Lovenox  Diet: DIET CARB CONTROL;  Code Status: Full Code  PT/OT Eval Status: Independent    Dispo -admitted stable observation       Herb Colorado, APRN - CNP    Thank you Baylee Mata MD for the opportunity to be involved in this patient's care. If you have any questions or concerns please feel free to contact me at [phone].

## 2020-09-03 NOTE — CONSULTS
The NP's [facility], EP NP) documentation has been prepared under my direction and personally reviewed by me in its entirety. I confirm that the consultation note created by the NP accurately reflects all work, physical examination, the discussion of treatments and procedures, and medical decision making by the NP. In addition, I have personally met with; performed a physical examination on; discussed the diagnosis (-es), treatment options including procedures, and formulated medical decisions for this patient. Please refer to the NP's consult note for full details on the assessment and plan. Today's plan is for EP study with PSVT ablation to rid the SVT. Thank you for allowing us to participate in the care of your patient. If you have any questions, please do not hesitate to contact us.      Antonina Guerra MD, MS, [facility] [place], [facility]  Cardiac Electrophysiology  [address]  [address] [facility], [address] [place] [address]  Jorge Ferro [facility] [address]  [phone]

## 2020-09-03 NOTE — PLAN OF CARE
Problem: Pain:  Goal: Pain level will decrease  Description: Pain level will decrease  Outcome: Ongoing  Goal: Control of acute pain  Description: Control of acute pain  Outcome: Ongoing  Pain/discomfort being managed with PRN analgesics per MD orders. Pt able to express presence and absence of pain and rate pain appropriately using numerical scale. Will continue to monitor.   Goal: Control of chronic pain  Description: Control of chronic pain  Outcome: Ongoing     Problem: Cardiac:  Goal: Ability to maintain an adequate cardiac output will improve  Description: Ability to maintain an adequate cardiac output will improve  Outcome: Ongoing  Goal: Complications related to the disease process, condition or treatment will be avoided or minimized  Description: Complications related to the disease process, condition or treatment will be avoided or minimized  Outcome: Ongoing  Goal: Risk factors for ineffective tissue perfusion will decrease  Description: Risk factors for ineffective tissue perfusion will decrease  Outcome: Ongoing

## 2020-09-03 NOTE — ANESTHESIA PRE PROCEDURE
[facility] Department of Anesthesiology  Pre-Anesthesia Evaluation/Consultation       Name:  Brodie Lloyd  : 1960  Age:  56 y.o. MRN:  [id number]  Date: 9/3/2020           Surgeon: * No surgeons listed *    Procedure: * No procedures listed *     Allergies   Allergen Reactions    Iv Dye [Iodides]     Erythromycin Hives     Patient Active Problem List   Diagnosis    [facility]     Past Medical History:   Diagnosis Date    DM (diabetes mellitus) ([facility]     GERD (gastroesophageal reflux disease)     Hypothyroidism      No past surgical history on file. Social History     Tobacco Use    Smoking status: Former Smoker     Types: Cigarettes     Last attempt to quit:      Years since quittin.6   Substance Use Topics    Alcohol use: No    Drug use: No     Medications  No current facility-administered medications on file prior to encounter.       Current Outpatient Medications on File Prior to Encounter   Medication Sig Dispense Refill    busPIRone (BUSPAR) 15 MG tablet Take 15 mg by mouth 2 times daily       PARoxetine (PAXIL) 20 MG tablet Take 20 mg by mouth every morning      traZODone (DESYREL) 100 MG tablet Take 100 mg by mouth nightly      Omeprazole (PRILOSEC PO) Take 20 mg by mouth daily      levothyroxine (SYNTHROID) 150 MCG tablet Take 150 mcg by mouth Daily      metFORMIN (GLUCOPHAGE) 500 MG tablet Take 500 mg by mouth 2 times daily (with meals)       Current Facility-Administered Medications   Medication Dose Route Frequency Provider Last Rate Last Dose    PARoxetine (PAXIL) tablet 20 mg  20 mg Oral QAM JAIME Diaz CNP   20 mg at 20 0823    traZODone (DESYREL) tablet 100 mg  100 mg Oral Nightly JAIME Diaz CNP   100 mg at 20 0200    sodium chloride flush 0.9 % injection 10 mL  10 mL Intravenous 2 times per day JAIME Diaz CNP        sodium chloride mL Intravenous 2 times per day Jazmin Kales, APRN - CNP        sodium chloride flush 0.9 % injection 10 mL  10 mL Intravenous PRN Jazmin Kales, APRN - CNP        magnesium sulfate 1 g in dextrose 5% 100 mL IVPB  1 g Intravenous Once Jazmin Kales, APRN -  mL/hr at 20 1031 1 g at 20 1031     Vital Signs (Current)   Vitals:    20 0110 20 0350 20 0859 20 1041   BP: 124/73 (!) 94/57  129/85   Pulse: 73 73  70   Resp: 18 16 18 16   Temp: 97.7 °F (36.5 °C) 97.9 °F (36.6 °C)  97.7 °F (36.5 °C)   TempSrc: Oral Oral  Oral   SpO2: 97% 93% 94% 96%   Weight:  198 lb 6.6 oz (90 kg)     Height:  5' 2\" (1.575 m)                                            BP Readings from Last 3 Encounters:   20 129/85   17 (!) 144/89     Vital Signs Statistics (for past 48 hrs)     Temp  Av.5 °F (36.4 °C)  Min: 97 °F (36.1 °C)   Min taken time: 20  Max: 97.9 °F (36.6 °C)   Max taken time: 20 035  Pulse  Av.7  Min: 79   Min taken time: 20 1041  Max: 169   Max taken time: 20 2100  Resp  Av.5  Min: 12   Min taken time: 20 0000  Max: 29   Max taken time: 20  BP  Min: 92/51   Min taken time: 20  Max: 144/78   Max taken time: 20  MAP (mmHg)  Av.8  Min: 58   Min taken time: 20  Max: 113   Max taken time: 20 2100  SpO2  Av.4 %  Min: 78 %   Min taken time: 20  Max: 100 %   Max taken time: 09/03/20 0000  BP Readings from Last 3 Encounters:   20 129/85   17 (!) 144/89       BMI  Body mass index is 36.29 kg/m². Estimated body mass index is 36.29 kg/m² as calculated from the following:    Height as of this encounter: 5' 2\" (1.575 m). Weight as of this encounter: 198 lb 6.6 oz (90 kg).     CBC   Lab Results   Component Value Date    WBC 6.7 2020    RBC 4.35 2020    HGB 13.0 2020    HCT 39.1 2020    MCV 89.9 2020    RDW 13.6 09/03/2020     09/03/2020     CMP    Lab Results   Component Value Date     09/03/2020    K 3.9 09/03/2020     09/03/2020    CO2 25 09/03/2020    BUN 9 09/03/2020    CREATININE 0.7 09/03/2020    GFRAA >60 09/03/2020    AGRATIO 1.4 09/02/2020    LABGLOM >60 09/03/2020    GLUCOSE 140 09/03/2020    PROT 7.7 09/02/2020    CALCIUM 8.7 09/03/2020    BILITOT 0.3 09/02/2020    ALKPHOS 94 09/02/2020    AST 30 09/02/2020    ALT 56 09/02/2020     BMP    Lab Results   Component Value Date     09/03/2020    K 3.9 09/03/2020     09/03/2020    CO2 25 09/03/2020    BUN 9 09/03/2020    CREATININE 0.7 09/03/2020    CALCIUM 8.7 09/03/2020    GFRAA >60 09/03/2020    LABGLOM >60 09/03/2020    GLUCOSE 140 09/03/2020     POCGlucose  Recent Labs     09/02/20 2055 09/03/20  0624   GLUCOSE 282* 140*      Coags    Lab Results   Component Value Date    PROTIME 11.8 09/02/2020    INR 1.02 09/02/2020    APTT 28.4 43/11/1211     HCG (If Applicable) No results found for: PREGTESTUR, PREGSERUM, HCG, HCGQUANT   ABGs No results found for: PHART, PO2ART, XKH3FLO, AQT3AMG, BEART, Y9TPIPTS   Type & Screen (If Applicable)  No results found for: LABABO, LABRH                         BMI: Wt Readings from Last 3 Encounters:       NPO Status:   Date of last liquid consumption: 09/03/20   Time of last liquid consumption: 0700   Date of last solid food consumption: 09/02/20      Time of last solid consumption: 1800       Anesthesia Evaluation  Patient summary reviewed  Airway: Mallampati: III  TM distance: >3 FB   Neck ROM: full  Mouth opening: > = 3 FB Dental: normal exam         Pulmonary:       (-) COPD, asthma and shortness of breath                           Cardiovascular:    (+) dysrhythmias: SVT,     (-) hypertension, valvular problems/murmurs, past MI, CAD, CABG/stent and  angina    ECG reviewed                        Neuro/Psych:      (-) seizures, TIA and CVA           GI/Hepatic/Renal:   (+) GERD:,      (-) PUD, liver disease and no renal disease       Endo/Other:    (+) DiabetesType II DM, , hypothyroidism::., .                 Abdominal:           Vascular: negative vascular ROS. Anesthesia Plan      general     ASA 3     (I discussed with the patient the risks and benefits of PIV, general anesthesia, IV Narcotics, PACU. All questions were answered the patient agrees with the plan.)  Induction: intravenous. Anesthetic plan and risks discussed with patient. Plan discussed with CRNA. This pre-anesthesia assessment may be used as a history and physical.    DOS STAFF ADDENDUM:    Pt seen and examined, chart reviewed (including anesthesia, drug and allergy history). No interval changes to history and physical examination. Anesthetic plan, risks, benefits, alternatives, and personnel involved discussed with patient. Patient verbalized an understanding and agrees to proceed.       Rashel Elkins MD  September 3, 2020  11:02 AM

## 2020-09-03 NOTE — PROCEDURES
[address]     Electrophysiology Procedure Note       Date of Procedure: 9/3/2020  Patient's Name: Brodie Lloyd  YOB: 1960   Medical Record Number: [id number]  Referring Physician: Denver Salles, MD  Procedure Performed by: Clement Camacho MD    Procedure performed:    · Comprehensive electrophysiological study with attempted induction of arrhythmia at baseline. · Attempted induction of arrhythmia after IV drug infusion. · Three-dimensional electroanatomic mapping of the right atrium  · Left atrial recording and mapping via coronary sinus   · Radiofrequency ablation of SVT, AV devante re-entry  · Drug infusion to induce atrial tachydysrhythmia  · Anesthesia: Local and Monitored Anesthesia Care  · Level of sedation plan: moderate sedation and general anesthesia with intubation by anesthesia staff  · (there were no independent trained observers who had no other duties involved in this procedure)      Indications for procedure:     Brodie Lloyd 56 y.o. female  with PMH of documented symptomatic SVT. Patient has had palpitation dyspnea, chest pain associated with documented SVT by EKG. Details of Procedure: The risks, benefits and alternatives of the ablation procedure were discussed with the patient. The risks including, but not limited to, the risks of bleeding, infection, radiation exposure, injury to vascular, cardiac and surrounding structures (including pneumothorax), stroke, cardiac perforation, tamponade, need for emergent open heart surgery, need for pacemaker implantation, myocardial infarction and death were discussed in detail. The patient was also counseled at length about the risks of madai Covid-19 in the lazaro-operative and post-operative states including the recovery window of their procedure.  The patient was made aware that madai Covid-19 after a surgical procedure may worsen their prognosis for recovering from the virus and lend to a higher morbidity and or mortality risk. The patient was given the option of postponing their procedure. The patient opted to proceed with the ablation. Written informed consent was signed and placed in the chart. The patient was brought to the electrophysiology lab in a fasting nonsedated state. Pre-sedation evaluation and airway assessment was completed. An independent trained observer assumed the sole responsibility of administering IV sedation medication - Versed, Fentanyl - at my direction and closely monitored the patient. The patient was monitored continuously with ECG, pulse oximetry, blood pressure monitoring, and direct observation. Both groins were prepped and draped in the usual sterile fashion. After injection of 2% lidocaine in the right groin, one 8.5F SRO sheath, one 8F short sheath, and one 6F short sheath were introduced to the right femoral vein. Without fluoroscopy but using a 3-D electroanatomic map that we created using the [employer], we advanced two quadripolar catheters sequentially to the high right atrium and right ventricular apex, while the [employer] SmartTouch-Surround Flow catheter was placed in the HIS position. We also used the Smart-Touch-Surround Flow catheter to enter the coronary sinus without fluoroscopy but utilizing the 3-D electroanatomic map so that the distal poles were in the coronary sinus for left atrial recording and mapping. After that, we did baseline measurements by pacing in both atria (R atrium, coronary sinus (for L atrial pacing/recording)) and right ventricle and programmed stimulation. . Sinus cycle length was 811 msec  . [place] interval: 123 msec  . QRS duration: 93 msec  . QT interval: 427 msec  . A-H interval of 103 msec  . H-V interval of 55 msec  . 1:1 antegrade conduction over AV block (AV devante wenckebach cycle length) was 310 msec   . Fast pathway ERP of 600/290 msec   . Slow pathway ERP of 600/250 msec  .  1:1 retrograde conduction over AV node (VA wenckebach cycle lenght) was 370 msec  . VA ERP of 600/270 msec     Arrhythmia Induction:  Patient was started on dobutamine up to 10 mcg/kg/min. A supraventricular tachydysrhythmia was not induced with programmed stimulation, but with programmed atrial stimulation, we found that there was dual AV devante physiology and that there was dual AV devante echo at A1A2 600/280. Given that the patient had a wide-complex tachycardia with short RP interval (SVT with aberrant (RBBB) conduction), and given the above findings, the diagnosis of AVNRT, slow-fast variant, was made. Mapping and Ablation: Then three-dimensional mapping system ([employer] navigation system) was used and a 3D electroanatomical map of the right atrium including His bundle and CS location was created. The ablation catheter was advanced into position along the septal aspect of the tricuspid valve under 3D mapping guidance. Electrophysiologic mapping was performed to localize an area on the anterior lip of the coronary sinus ostium where an atrial-ventricular ratio of more than 1:10 could be obtained. Also, we performed a voltage map of the slow pathway region, targeting areas of transition from no-voltage to low-voltage below the His region. Following identification of this area, radiofrequency lesions were delivered (27 W, 120 seconds) with demonstration of many accelerated junctional rhythm with maintenance of retrograde conduction. This resulted in successful ablation of slow pathway which was later confirmed by lack of AH jump. We also tried to induced the tachycardia by atrial and ventricular extra-stimuli which showed the disappearance of AH jump and no re-entrant tachycardia was induced. Following ablation, and appropriate waiting time, programmed stimulation was performed off and on Dobutamine (up to 10 mcg/min). No atrial tachydysrhythmias or double AV devante echo beats were noted post ablation.  There was no presence of the slow pathway as [address] [place] [address], [address] [place] [address]  Jorge Ferro [place] [address]  [phone]

## 2020-09-03 NOTE — ED NOTES
ED SBAR report provider to [facility]. Patient to be transported to Room 4269 via stretcher by [facility]. Patient transported with bedside cardiac monitor and with IV medications infusing. IV site clean, dry, and intact. MEWS score and pain assessed as 0/10 and documented. Updated patient and family on plan of care.      Stu Burleson RN  09/03/20 8518

## 2020-09-03 NOTE — CONSULTS
Cardiac Electrophysiology Consultation   Date: 9/3/2020  Admit Date:  9/2/2020  Admission Diagnosis: SVT (supraventricular tachycardia) ([facility] [I47.1]  SVT (supraventricular tachycardia) ([facility]) [I47.1]  SVT (supraventricular tachycardia) ([facility] [I47.1]     Reason for Consultation: SVT  Consult Requesting Physician: Chi Schwab MD     History of Present Illness  Graham Singh is a 56y.o. year old female with past medical history significant for DM, hypothyroidism and GERD who presented to the ED complaining of near syncope and chest pain. 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 She also started to have centralized chest \"burning\" and \"pressure\" that radiated up to her neck. She was able to go home and lie down but she continued to have chest pain and diaphoresis. Around 1800 she continued to have symptoms so she checked her BS which was over 300 which was very unusual for her so she came into the ED. She denies any SOB or palpitations. No recent orthopnea or PND. No previous cardiac problems or work up. In the ED she was noted to be in SVT in the 160s. Vagal maneuvers were tried but were unsuccessful. She then received 6mg of adenosine which converted her to SR. She felt much better after this, had some continued CP but it was much improved. This morning she feels back to her baseline, denies any CP. She is complaining of a headache and soreness in the right side of her neck/shoulder which she has had for a couple weeks and has attributed it to pulling a muscle. Past Medical History:   Diagnosis Date    DM (diabetes mellitus) ([facility]     GERD (gastroesophageal reflux disease)     Hypothyroidism         No past surgical history on file.     Current Outpatient Medications   Medication Instructions    busPIRone (BUSPAR) 15 mg, Oral, 2 TIMES DAILY    levothyroxine (SYNTHROID) 150 mcg, Oral, DAILY    metFORMIN (GLUCOPHAGE) 500 mg, Oral, 2 TIMES DAILY WITH MEALS    Omeprazole (PRILOSEC PO) 20 mg, Oral, DAILY    PARoxetine (PAXIL) 20 mg, Oral, EVERY MORNING    traZODone (DESYREL) 100 mg, Oral, NIGHTLY        Allergies   Allergen Reactions    Iv Dye [Iodides]     Erythromycin Hives       Social History:   reports that she quit smoking about 29 years ago. Her smoking use included cigarettes. She does not have any smokeless tobacco history on file. She reports that she does not drink alcohol or use drugs. Family History:  Negative for SCD. Review of Systems:  · General: negative for fever, chills   · Ophthalmic ROS: negative for eye pain or loss of vision  · ENT ROS: negative for headaches, sore throat, nasal drainage  · Respiratory: negative for cough, sputum, SOB  · Cardiovascular: positive for chest pain, near syncope, no palpitations.   · Gastrointestinal: negative for abdominal pain, diarrhea, N/V  · Hematology: negative for bleeding, blood clots, bruising or jaundice  · Genito-Urinary:  negative for dysuria or incontinence  · Musculoskeletal: negative for joint swelling, muscle pain  · Neurological: negative for confusion, dizziness, headaches   · Psychiatric: negative anxiety, depression  · Dermatological: negative for rash    Medications:  Scheduled Meds:   PARoxetine  20 mg Oral QAM    traZODone  100 mg Oral Nightly    sodium chloride flush  10 mL Intravenous 2 times per day    insulin lispro  0-6 Units Subcutaneous TID WC    insulin lispro  0-3 Units Subcutaneous Nightly    busPIRone  15 mg Oral BID    levothyroxine  50 mcg Oral Daily    aspirin  81 mg Oral Daily    sodium chloride flush  10 mL Intravenous 2 times per day      Continuous Infusions:   dextrose      sodium chloride       PRN Meds:.sodium chloride flush, acetaminophen **OR** acetaminophen, polyethylene glycol, promethazine **OR** ondansetron, perflutren lipid No results found for: CHOL, HDL, TRIG    Diagnostic and imaging results reviewed. EC20  WCT at 167 BPM, likely SVT. RBBB. LAD    9/3/20  0715  SR at 77 BPM. Anterolateral T wave inversions. 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, slow-fast   - pt had symptoms for several hours yesterday, likely due to the SVT although she denies ever having palpitations   - converted to SR with 6mg of adenosine after vagal maneuvers did not work   - discussed the progressive nature of SVT with pt, discussed medication control versus ablation including risks and benefits of each - pt would like to try and avoid additional medications/side effects and would like to proceed with ablation    - Keep NPO for ablation later today, COVID test for anesthesia, can likely be d/c'd from cardiac standpoint after procedure if no complications     Elevated troponin    - likely secondary to above, pt did have CP but subsided with resolution of SVT   - EKG with anterolateral T wave inversions, no previous EKGs to compare to    - consider outpatient ischemia work up as this does not appear to be ACS but pt does have risk factors, would also get outpatient echo    Hypothyroidism   - TSH normal    DM type 2, uncomplicated   - BS management per Hospitalist    Discussed with Dr. Elda Steiner.     JAIME Carlson  [address], [address] [place] [address]  Phone: [phone]  Fax: [phone]    Electronically signed by JAIME Avalos - CNP on 9/3/2020 at 10:00 AM

## 2020-09-03 NOTE — DISCHARGE SUMMARY
Hospital Medicine Discharge Summary    Patient ID: Elizabeth Alvarez      Patient's PCP: Liz Steiner MD    Admit Date: 9/2/2020     Discharge Date:   9/3/2020    Admitting Physician: Sara Love MD     Discharge Physician: Kelsey Martinez MD     Discharge Diagnoses: Active Hospital Problems    Diagnosis Date Noted    SVT (supraventricular tachycardia) ([facility] [I47.1] 09/02/2020       The patient was seen and examined on day of discharge and this discharge summary is in conjunction with any daily progress note from day of discharge. Hospital Course: The patient is a 56 y.o. female who presents to [facility] with history of hypothyroidism, gastric reflux, type 2 diabetes. She presented to the emergency department with elevated heart rate and several hours of dizziness and generalized chest pressure. Patient denies syncope or near syncope, however she felt like she could pass out a couple of times during the day. She thought it was her blood sugar and checked her blood sugar. When she arrived she had a heart rate roughly 160-1 70. Dr. Juan Lua, ED attending attempted Valsalva maneuver which was unsuccessful and he proceeded to a cardioversion.     On arrival to the hospital  After transfer the patient reports that she just feels tired but other than that she is not having chest pain. She denies any recent medication changes, diet pills. She denies any new medications. She denies any caffeine withdrawal or change of diet. She denies any recent cough cold fever or flu.       Supraventricular tachycardia  -EP consulted  -underwent successful of AV devante re-entrant tachycardia  -bedrest for 1 hour, and then may discharge home if remains stable  -follow-up with EP in 3 months  -outpatient Echo and stress test to evaluate mild increase in troponin level    Elevated troponin - suspect demand ischemia  -outpatient Echo and stress test per cardiology recs at discharge    Hypothyroidism  -continue home meds  -TSH WNL    Type 2 DM  -resume metformin at discharge    GERD  -PPI    Anxiety and depression  -continue home meds      Exam:     /73   Pulse 77   Temp 97.4 °F (36.3 °C) (Oral)   Resp 16   Ht 5' 2\" (1.575 m)   Wt 198 lb 6.6 oz (90 kg)   SpO2 91%   BMI 36.29 kg/m²       General appearance:  No apparent distress, appears stated age and cooperative. HEENT:  Normal cephalic, atraumatic without obvious deformity. Pupils equal, round, and reactive to light. Extra ocular muscles intact. Conjunctivae/corneas clear. Neck: Supple, with full range of motion. No jugular venous distention. Trachea midline. Respiratory:  Normal respiratory effort. Clear to auscultation, bilaterally without Rales/Wheezes/Rhonchi. Cardiovascular:  Regular rate and rhythm with normal S1/S2 without murmurs, rubs or gallops. Abdomen: Soft, non-tender, non-distended with normal bowel sounds. Musculoskeletal:  No clubbing, cyanosis or edema bilaterally. Full range of motion without deformity. Skin: Skin color, texture, turgor normal.  No rashes or lesions. Neurologic:  Neurovascularly intact without any focal sensory/motor deficits. Cranial nerves: II-XII intact, grossly non-focal.  Psychiatric:  Alert and oriented, thought content appropriate, normal insight  Capillary Refill: Brisk,< 3 seconds   Peripheral Pulses: +2 palpable, equal bilaterally       Labs:  For convenience and continuity at follow-up the following most recent labs are provided:      CBC:    Lab Results   Component Value Date    WBC 6.7 09/03/2020    HGB 13.0 09/03/2020    HCT 39.1 09/03/2020     09/03/2020       Renal:    Lab Results   Component Value Date     09/03/2020    K 3.9 09/03/2020     09/03/2020    CO2 25 09/03/2020    BUN 9 09/03/2020    CREATININE 0.7 09/03/2020    CALCIUM 8.7 09/03/2020         Significant Diagnostic Studies    Radiology:   CT HEAD WO CONTRAST   Final Result   No acute intracranial abnormality. XR CHEST 1 VIEW   Final Result   No acute abnormality detected. NM MYOCARDIAL SPECT REST EXERCISE OR RX    (Results Pending)          Consults:     IP CONSULT TO HOSPITALIST  IP CONSULT TO CARDIOLOGY    Disposition:  home     Condition at Discharge: Stable    Discharge Instructions/Follow-up:  meds as prescribed, follow-up with PCP and EP NP in 3 months    Code Status:  Full Code     Activity: activity as tolerated    Diet: diabetic diet      Discharge Medications:     Current Discharge Medication List           Details   aspirin 81 MG chewable tablet Take 1 tablet by mouth daily  Qty: 30 tablet, Refills: 3              Details   busPIRone (BUSPAR) 15 MG tablet Take 15 mg by mouth 2 times daily       PARoxetine (PAXIL) 20 MG tablet Take 20 mg by mouth every morning      traZODone (DESYREL) 100 MG tablet Take 100 mg by mouth nightly      Omeprazole (PRILOSEC PO) Take 20 mg by mouth daily      levothyroxine (SYNTHROID) 150 MCG tablet Take 150 mcg by mouth Daily      metFORMIN (GLUCOPHAGE) 500 MG tablet Take 500 mg by mouth 2 times daily (with meals)             Time Spent on discharge is more than 30 minutes in the examination, evaluation, counseling and review of medications and discharge plan. Signed:    Denver Salles, MD   9/3/2020      Thank you Nayely Chavez MD for the opportunity to be involved in this patient's care. If you have any questions or concerns please feel free to contact me at [phone].

## 2020-09-03 NOTE — PROGRESS NOTES
Figure 8 removed at this time, no s/s hematoma/bleeding , pt tolerates juwan and izzy, will cont to monitor [facility]

## 2020-09-03 NOTE — PROGRESS NOTES
Patient admitted to PACU # 7 from CATH LAB at 1254 post ABLATION  per DR Ashwini Hsieh. Attached to PACU monitoring system and report received from anesthesia provider. Patient was reported to be hemodynamically stable during procedure. Patient drowsy on admission and denied pain.

## 2020-09-03 NOTE — ANESTHESIA POSTPROCEDURE EVALUATION
Department of Anesthesiology  Postprocedure Note    Patient: Yolie Reddy  MRN: [id number]  YOB: 1960  Date of evaluation: 9/3/2020  Time:  2:16 PM     Procedure Summary     Date:  09/03/20 Room / Location:  [facility] Cath Lab    Anesthesia Start:  1110 Anesthesia Stop:  7003    Procedure:  ABLATION Diagnosis:      Scheduled Providers:   Responsible Provider:  Faiza Moreno MD    Anesthesia Type:  general ASA Status:  3          Anesthesia Type: general    Vidhya Phase I: Vidhya Score: 8    Vidhya Phase II:      Last vitals: Reviewed and per EMR flowsheets.        Anesthesia Post Evaluation    Patient location during evaluation: PACU  Level of consciousness: awake and alert  Airway patency: patent  Nausea & Vomiting: no nausea and no vomiting  Complications: no  Cardiovascular status: blood pressure returned to baseline  Respiratory status: acceptable  Hydration status: euvolemic  Comments: Postoperative Anesthesia Note    Name:    Yolie Reddy  MRN:      [id number]    Patient Vitals in the past 12 hrs:  09/03/20 1353, BP:124/75, Temp:96.8 °F (36 °C), Temp src:Temporal, Pulse:88, Resp:16, SpO2:92 %  09/03/20 1345, BP:117/72, Pulse:79, Resp:11, SpO2:91 %  09/03/20 1330, BP:120/70, Pulse:79, Resp:12, SpO2:93 %  09/03/20 1316, BP:117/66, Pulse:82, Resp:13, SpO2:95 %  09/03/20 1310, BP:125/72, Pulse:81, Resp:13, SpO2:98 %  09/03/20 1305, BP:126/71, Pulse:84, Resp:12, SpO2:97 %  09/03/20 1300, BP:123/70, Pulse:85, Resp:12, SpO2:97 %  09/03/20 1255, BP:(!) 117/59, Resp:24  09/03/20 1254, BP:(!) 117/59, Temp:97 °F (36.1 °C), Pulse:93, Resp:24, SpO2:97 %  09/03/20 1041, BP:129/85, Temp:97.7 °F (36.5 °C), Temp src:Oral, Pulse:70, Resp:16, SpO2:96 %  09/03/20 0859, Resp:18, SpO2:94 %  09/03/20 0350, BP:(!) 94/57, Temp:97.9 °F (36.6 °C), Temp src:Oral, Pulse:73, Resp:16, SpO2:93 %, Height:5' 2\" (1.575 m), Weight:198 lb 6.6 oz (90 kg)     LABS:    CBC  Lab Results       Component                Value Date/Time                  WBC                      6.7                 09/03/2020 06:24 AM        HGB                      13.0                09/03/2020 06:24 AM        HCT                      39.1                09/03/2020 06:24 AM        PLT                      166                 09/03/2020 06:24 AM   RENAL  Lab Results       Component                Value               Date/Time                  NA                       143                 09/03/2020 06:24 AM        K                        3.9                 09/03/2020 06:24 AM        CL                       107                 09/03/2020 06:24 AM        CO2                      25                  09/03/2020 06:24 AM        BUN                      9                   09/03/2020 06:24 AM        CREATININE               0.7                 09/03/2020 06:24 AM        GLUCOSE                  140 (H)             09/03/2020 06:24 AM   COAGS  Lab Results       Component                Value               Date/Time                  PROTIME                  11.8                09/02/2020 08:55 PM        INR                      1.02                09/02/2020 08:55 PM        APTT                     28.4                09/02/2020 08:55 PM     Intake & Output:  @87BREM@    Nausea & Vomiting:  No    Level of Consciousness:  Awake    Pain Assessment:  Adequate analgesia    Anesthesia Complications:  No apparent anesthetic complications    SUMMARY      Vital signs stable  OK to discharge from Stage I post anesthesia care.   Care transferred from Anesthesiology department on discharge from perioperative area

## 2020-09-03 NOTE — PROGRESS NOTES
Rapid COVID sent. Informed consent form signed. Pre-op checklist complete. IV fluids infusing. Patient is in a snapped gown with no street clothes or jewelry. Will continue to monitor.  Electronically signed by Anila Varner RN on 9/3/2020 at 10:38 AM

## 2020-09-03 NOTE — PLAN OF CARE
Problem: Pain:  Goal: Pain level will decrease  Description: Pain level will decrease  9/3/2020 1739 by Kevin Duque RN  Outcome: Completed  9/3/2020 0827 by Joaquin Villagomez RN  Outcome: Ongoing  9/3/2020 0443 by June Moore RN  Outcome: Ongoing  Goal: Control of acute pain  Description: Control of acute pain  9/3/2020 1739 by Kevin Duque RN  Outcome: Completed  9/3/2020 0827 by Joaquin Villagomez RN  Outcome: Ongoing  9/3/2020 0443 by June Moore RN  Outcome: Ongoing  Goal: Control of chronic pain  Description: Control of chronic pain  9/3/2020 1739 by Kevin Duque RN  Outcome: Completed  9/3/2020 0827 by Joaquin Villagomez RN  Outcome: Ongoing  9/3/2020 0443 by June Moore RN  Outcome: Ongoing     Problem: Pain:  Goal: Control of acute pain  Description: Control of acute pain  9/3/2020 1739 by Kevin Duque RN  Outcome: Completed  9/3/2020 0827 by Joaquin Villagomez RN  Outcome: Ongoing  9/3/2020 0443 by June Moore RN  Outcome: Ongoing     Problem: Cardiac:  Goal: Ability to maintain an adequate cardiac output will improve  Description: Ability to maintain an adequate cardiac output will improve  9/3/2020 1739 by Kevin Duque RN  Outcome: Completed  9/3/2020 0827 by Joaquin Villagomez RN  Outcome: Ongoing  9/3/2020 0443 by June Moore RN  Outcome: Ongoing  Goal: Complications related to the disease process, condition or treatment will be avoided or minimized  Description: Complications related to the disease process, condition or treatment will be avoided or minimized  9/3/2020 1739 by Kevin Duque RN  Outcome: Completed  9/3/2020 0827 by Joaquin Villagomez RN  Outcome: Ongoing  9/3/2020 0443 by June Moore RN  Outcome: Ongoing  Goal: Risk factors for ineffective tissue perfusion will decrease  Description: Risk factors for ineffective tissue perfusion will decrease  9/3/2020 1739 by Kevin Duque RN  Outcome: Completed  9/3/2020 0827 by Joaquin Villagomez RN  Outcome: Ongoing  9/3/2020 0443 by Valerie Lopez RN  Outcome: Ongoing     Problem: Falls - Risk of:  Goal: Will remain free from falls  Description: Will remain free from falls  9/3/2020 1739 by Dimitri Mayers RN  Outcome: Completed  9/3/2020 0827 by Stephanie Khan RN  Outcome: Ongoing  Goal: Absence of physical injury  Description: Absence of physical injury  9/3/2020 1739 by Dimitri Mayers RN  Outcome: Completed  9/3/2020 0827 by Stephanie Khan RN  Outcome: Ongoing

## 2020-09-03 NOTE — CARE COORDINATION
INITIAL CASE MANAGEMENT ASSESSMENT    Reviewed chart. Patient is in covid r/o. Call to patient in room to assess possible discharge needs. Explained Case Management role/services. Living Situation: Patient lives alone in an apt with 12 RAMA. Confirmed home address    ADLs: Independent     DME: None used    PT/OT Recs: None ordered     Active Services: None     Transportation: Active . Will have transportation home at discharge. Medications: No barriers to taking/obtaining medications. [facility]    PCP: Dr. Celia Ross       HD/PD: N/A    PLAN/COMMENTS: No needs identified at this time    SW/CM provided contact information for patient or family to call with any questions. SW/CM will follow and assist as needed.       Electronically signed by CARIN Arriaza on 9/3/2020 at 10:20 AM

## 2020-09-03 NOTE — ED PROVIDER NOTES
organization: Not on file     Attends meetings of clubs or organizations: Not on file     Relationship status: Not on file    Intimate partner violence     Fear of current or ex partner: Not on file     Emotionally abused: Not on file     Physically abused: Not on file     Forced sexual activity: Not on file   Other Topics Concern    Not on file   Social History Narrative    Not on file     Current Facility-Administered Medications   Medication Dose Route Frequency Provider Last Rate Last Dose    0.9 % sodium chloride bolus  1,000 mL Intravenous Once Denise Abdul MD 2,000 mL/hr at 09/02/20 2119 1,000 mL at 09/02/20 2119     Current Outpatient Medications   Medication Sig Dispense Refill    ibuprofen (ADVIL;MOTRIN) 800 MG tablet Take 800 mg by mouth every 6 hours as needed for Pain      busPIRone (BUSPAR) 10 MG tablet Take 100 mg by mouth 3 times daily      PARoxetine (PAXIL) 20 MG tablet Take 20 mg by mouth every morning      traZODone (DESYREL) 100 MG tablet Take 100 mg by mouth nightly      Omeprazole (PRILOSEC PO) Take 20 mg by mouth daily       Allergies   Allergen Reactions    Iv Dye [Iodides]        [unfilled]    Nursing Notes Reviewed    Physical Exam:  Vitals:    09/02/20 2145   BP: 124/85   Pulse: 90   Resp:    Temp:    SpO2: (!) 78%       GENERAL APPEARANCE: Awake and alert. Cooperative. No acute distress. HEAD: Normocephalic. Atraumatic. EYES: EOM's grossly intact. Sclera anicteric. ENT: Mucous membranes are moist. Tolerates saliva. No trismus. NECK: Supple. No meningismus. Trachea midline. HEART: Markedly tachycardic. radial pulses 2+. LUNGS: Respirations unlabored. CTAB  ABDOMEN: Soft. Non-tender. No guarding or rebound. EXTREMITIES: No acute deformities. SKIN: Warm and dry. NEUROLOGICAL: No gross facial drooping. Moves all 4 extremities spontaneously. PSYCHIATRIC: Normal mood.     I have reviewed and interpreted all of the currently available lab results from this visit (if applicable):  Results for orders placed or performed during the hospital encounter of 09/02/20   CBC Auto Differential   Result Value Ref Range    WBC 9.9 4.0 - 11.0 K/uL    RBC 4.81 4.00 - 5.20 M/uL    Hemoglobin 14.5 12.0 - 16.0 g/dL    Hematocrit 43.5 36.0 - 48.0 %    MCV 90.5 80.0 - 100.0 fL    MCH 30.1 26.0 - 34.0 pg    MCHC 33.3 31.0 - 36.0 g/dL    RDW 13.6 12.4 - 15.4 %    Platelets 539 507 - 979 K/uL    MPV 9.6 5.0 - 10.5 fL    Neutrophils % 60.1 %    Lymphocytes % 30.1 %    Monocytes % 8.2 %    Eosinophils % 1.1 %    Basophils % 0.5 %    Neutrophils Absolute 5.9 1.7 - 7.7 K/uL    Lymphocytes Absolute 3.0 1.0 - 5.1 K/uL    Monocytes Absolute 0.8 0.0 - 1.3 K/uL    Eosinophils Absolute 0.1 0.0 - 0.6 K/uL    Basophils Absolute 0.1 0.0 - 0.2 K/uL   Comprehensive Metabolic Panel   Result Value Ref Range    Sodium 137 136 - 145 mmol/L    Potassium 4.0 3.5 - 5.1 mmol/L    Chloride 98 (L) 99 - 110 mmol/L    CO2 22 21 - 32 mmol/L    Anion Gap 17 (H) 3 - 16    Glucose 282 (H) 70 - 99 mg/dL    BUN 12 7 - 20 mg/dL    CREATININE 0.8 0.6 - 1.2 mg/dL    GFR Non-African American >60 >60    GFR African American >60 >60    Calcium 9.4 8.3 - 10.6 mg/dL    Total Protein 7.7 6.4 - 8.2 g/dL    Alb 4.5 3.4 - 5.0 g/dL    Albumin/Globulin Ratio 1.4 1.1 - 2.2    Total Bilirubin 0.3 0.0 - 1.0 mg/dL    Alkaline Phosphatase 94 40 - 129 U/L    ALT 56 (H) 10 - 40 U/L    AST 30 15 - 37 U/L    Globulin 3.2 g/dL   Protime-INR   Result Value Ref Range    Protime 11.8 10.0 - 13.2 sec    INR 1.02 0.86 - 1.14   APTT   Result Value Ref Range    aPTT 28.4 24.2 - 36.2 sec   Urine reflex to culture    Specimen: Urine, clean catch   Result Value Ref Range    Color, UA Yellow Straw/Yellow    Clarity, UA Clear Clear    Glucose, Ur 100 (A) Negative mg/dL    Bilirubin Urine Negative Negative    Ketones, Urine Negative Negative mg/dL    Specific Gravity, UA <=1.005 1.005 - 1.030    Blood, Urine Negative Negative    pH, UA 6.0 5.0 - 8.0    Protein, UA Negative Negative mg/dL    Urobilinogen, Urine 0.2 <2.0 E.U./dL    Nitrite, Urine Negative Negative    Leukocyte Esterase, Urine Negative Negative    Microscopic Examination Not Indicated     Urine Type Cleancatch     Urine Reflex to Culture Not Indicated    Troponin   Result Value Ref Range    Troponin 0.07 (H) <0.01 ng/mL   Magnesium   Result Value Ref Range    Magnesium 1.90 1.80 - 2.40 mg/dL   POCT Glucose   Result Value Ref Range    POC Glucose 280 (H) 70 - 99 mg/dl    Performed on ACCU-CHEK         Radiographs (if obtained):  [] The following radiograph was interpreted by myself in the absence of a radiologist:  [x] Radiologist's Report Reviewed:     CT HEAD WO CONTRAST (Final result)   Result time 09/02/20 22:11:50   Final result by Isabella Palmer DO (09/02/20 22:11:50)                 Impression:     No acute intracranial abnormality. Narrative:     EXAMINATION:   CT OF THE HEAD WITHOUT CONTRAST  9/2/2020 8:52 pm     TECHNIQUE:   CT of the head was performed without the administration of intravenous   contrast. Dose modulation, iterative reconstruction, and/or weight based   adjustment of the mA/kV was utilized to reduce the radiation dose to as low   as reasonably achievable. COMPARISON:   None. HISTORY:   ORDERING SYSTEM PROVIDED HISTORY: dizziness, presyncope   TECHNOLOGIST PROVIDED HISTORY:   Reason for exam:->dizziness, presyncope   Has a \"code stroke\" or \"stroke alert\" been called? ->No   Reason for Exam: Dizziness, presyncope   Acuity: Acute   Type of Exam: Initial   Additional signs and symptoms: Tachycardia (Pt C/O ongoing dizziness and   chest pressure for several hours, upon arrival HR of 170)   Relevant Medical/Surgical History: Hx of hypertension.  No hx of any relevant   surgeries or of any cancer.      FINDINGS:   BRAIN/VENTRICLES: There is no acute intracranial hemorrhage, mass effect or   midline shift.  No abnormal extra-axial fluid collection.  The gray-white differentiation is maintained without evidence of an acute infarct. Streeter Ladarius is   no evidence of hydrocephalus. ORBITS: The visualized portion of the orbits demonstrate no acute abnormality. SINUSES: The visualized paranasal sinuses and mastoid air cells demonstrate   no acute abnormality. SOFT TISSUES/SKULL:  No acute abnormality of the visualized skull or soft   tissues.                       XR CHEST 1 VIEW (Final result)   Result time 09/02/20 21:57:09   Final result by Falguni Gomez MD (09/02/20 21:57:09)                 Impression:     No acute abnormality detected. Narrative:     EXAMINATION:   ONE XRAY VIEW OF THE CHEST     9/2/2020 5:53 pm     COMPARISON:   None. HISTORY:   ORDERING SYSTEM PROVIDED HISTORY: presyncope   TECHNOLOGIST PROVIDED HISTORY:   Reason for exam:->presyncope   Reason for Exam: Presyncope, dizziness, and pressure in the center of her   chest.   Acuity: Acute   Type of Exam: Subsequent/Follow-up   Additional signs and symptoms: Tachycardia (Pt C/O ongoing dizziness and   chest pressure for several hours, upon arrival HR of 170)   Relevant Medical/Surgical History: Hx of hypertension. FINDINGS:   The cardial pericardial silhouette is unremarkable.  There is mild elevation   of the right hemidiaphragm.  No focal infiltrates are seen.  No pneumothorax   is found.  No free air.  No acute bony abnormality.  Defibrillator pad   overlies the left hemithorax.                        EKG (if obtained): (All EKG's are interpreted by myself in the absence of a cardiologist)  Initial EKG on my interpretation shows SVT with a rate of 167 bpm, LAD, RBBB noted. No ST segment elevation. EKG done after cardioversion shows normal sinus rhythm with a rate of 96 bpm with LAD and RBBB      MDM:  Differential diagnosis: SVT, ACS, A. fib, electrolyte abnormality, CVA    Patient arrives in SVT which is symptomatic.   Patient was verbally consented for cardioversion and underwent such, see procedure note. Cardioversion proceeded to go well patient maintained in normal sinus rhythm. Patient's labs are generally reassuring however there is a mild troponin elevation to 0.07.  and IVF given. Aspirin given and patient will be admitted      Cardiovert / Defib    Date/Time: 9/2/2020 9:11 PM  Performed by: Carey Randall MD  Authorized by: Carey Randall MD     Consent:     Consent obtained:  Verbal    Consent given by:  Patient    Risks discussed:  Cutaneous burn, death, induced arrhythmia and pain    Alternatives discussed:  No treatment  Pre-procedure details:     Cardioversion basis:  Emergent    Rhythm:  Supraventricular tachycardia    Electrode placement:  Anterior-posterior  Attempt one:     Cardioversion mode attempt one: 6 mg of ADENOSINE. Post-procedure details:     Patient status:  Awake    Patient tolerance of procedure: Tolerated well, no immediate complications  Comments:      Patient was given wide-open IV fluid along with Zofran for pretreatment and then given 6 mg of IV adenosine which converted her to normal sinus rhythm          Old records reviewed. Labs and imaging reviewed and results discussed with patient. .        Patient was given scripts for the following medications. I counseled patient how to take these medications. New Prescriptions    No medications on file         CRITICAL CARE TIME   Total Critical Care time was 0 minutes, excluding separately reportable procedures. There was a high probability of clinically significant/life threatening deterioration in the patient's condition which required my urgent intervention.       Clinical Impression:  SVT, elevated blood glucose, elevated troponin  (Please note that portions of this note may have been completed with a voice recognition program. Efforts were made to edit the dictations but occasionally words are mis-transcribed.)    MD Carey Santiago Si, MD  09/02/20 5316

## 2020-09-03 NOTE — PROGRESS NOTES
PACU Transfer Note    Vitals:    09/03/20 1353   BP: 124/75   Pulse: 88   Resp: 16   Temp: 96.8 °F (36 °C)   SpO2: 92%       In: 550 [I.V.:550]  Out: -     Pain assessment:  None; does have a headache  Pain Level: 0    Report given to Receiving unit RN.    9/3/2020 1:58 PM

## 2020-09-04 ENCOUNTER — TELEPHONE (OUTPATIENT)
Dept: CARDIOLOGY CLINIC | Age: 60
End: 2020-09-04

## 2020-09-04 LAB
EKG ATRIAL RATE: 78 BPM
EKG DIAGNOSIS: NORMAL
EKG P AXIS: 48 DEGREES
EKG P-R INTERVAL: 170 MS
EKG Q-T INTERVAL: 406 MS
EKG QRS DURATION: 76 MS
EKG QTC CALCULATION (BAZETT): 462 MS
EKG R AXIS: -25 DEGREES
EKG T AXIS: -87 DEGREES
EKG VENTRICULAR RATE: 78 BPM

## 2020-09-04 PROCEDURE — 93010 ELECTROCARDIOGRAM REPORT: CPT | Performed by: INTERNAL MEDICINE

## 2020-09-04 NOTE — TELEPHONE ENCOUNTER
Pt called stated she just got out of hospital 9/3. Her d/c papers did not say when she should return to work. She  has an appt w/her PCP on 9/8. She was planning on going back on Wed 9/9. Would like a note stating such.     Christian Lynn # [phone]

## 2020-09-04 NOTE — TELEPHONE ENCOUNTER
Please see Dr. Aury Tranr recommendations to return to work without restrictions and prepare a letter.

## 2020-09-04 NOTE — TELEPHONE ENCOUNTER
Dr. Hendrick Riedel,    Patient underwent tadiofrequency ablation of SVT, AV devante re-entry on 9/3/2020 while an inpatient. She has an appointment with her PCP on 9/8/2020 and would like to return to work on 9/9/2020. Ok to send letter stating patient may return to work on Wednesday 9/9/2020 without restrictions or limitations?     Thanks,  Mitul Meyer RN

## 2020-12-24 ENCOUNTER — OFFICE VISIT (OUTPATIENT)
Dept: PRIMARY CARE CLINIC | Age: 60
End: 2020-12-24
Payer: MEDICARE

## 2020-12-24 PROCEDURE — G8428 CUR MEDS NOT DOCUMENT: HCPCS | Performed by: NURSE PRACTITIONER

## 2020-12-24 PROCEDURE — G8419 CALC BMI OUT NRM PARAM NOF/U: HCPCS | Performed by: NURSE PRACTITIONER

## 2020-12-24 PROCEDURE — 99211 OFF/OP EST MAY X REQ PHY/QHP: CPT | Performed by: NURSE PRACTITIONER

## 2020-12-24 NOTE — PROGRESS NOTES
Mery Colindres received a viral test for COVID-19. They were educated on isolation and quarantine as appropriate. For any symptoms, they were directed to seek care from their PCP, given contact information to establish with a doctor, directed to an urgent care or the emergency room.

## 2020-12-25 LAB — SARS-COV-2, PCR: DETECTED

## 2020-12-26 NOTE — RESULT ENCOUNTER NOTE
Called pt to schedule Pre-Op appointment. Offered 6/9/17 at 0900. Left message to call back.    Your test came back detected/positive for Covid-19. What happens if I have a positive test?  If you have symptoms:  Isolate until all three of these things are true: 1) your symptoms are better, 2) it has been 10 days since you first felt sick, and 3) you have had no fever for at least 24 hours without using medicine that lowers fever. Drink plenty of fluids and eat when you can. You may take medicine for pain or fever if you need to. Rest as much as you can. If you do not have symptoms:  Stay home for 10 days after the date you were tested. If you develop symptoms during those 10 days, stay home until all three of these things are true: 1) your symptoms are better, 2) it has been 10 days since you first felt sick, and 3) you have had no fever for at least 24 hours without using medicine that lowers fever. Follow care instructions from your doctor or other healthcare provider. Seek emergency medical care immediately if you have trouble breathing, persistent pain or pressure in the chest, new confusion, inability to wake or stay awake, or bluish lips or face. Someone from the health department (case investigator or contact tracer) may reach out to you to check on your health and ask about other people you have been around or where you've spent time while you may have been able to spread COVID-19 to others. This person's role is strictly to map the virus to help identify people who may have been exposed to the virus and prevent its spread. The local health department will also provide guidance on how to stay safely at home to avoid spreading illness. Detected results can happen for months and you are not considered contagious. No retest is necessary.